# Patient Record
Sex: MALE | Race: WHITE | NOT HISPANIC OR LATINO | URBAN - METROPOLITAN AREA
[De-identification: names, ages, dates, MRNs, and addresses within clinical notes are randomized per-mention and may not be internally consistent; named-entity substitution may affect disease eponyms.]

---

## 2025-06-04 ENCOUNTER — APPOINTMENT (OUTPATIENT)
Dept: PRIMARY CARE | Facility: CLINIC | Age: 60
End: 2025-06-04

## 2025-06-04 VITALS
DIASTOLIC BLOOD PRESSURE: 58 MMHG | RESPIRATION RATE: 16 BRPM | OXYGEN SATURATION: 96 % | WEIGHT: 165 LBS | HEIGHT: 69 IN | BODY MASS INDEX: 24.44 KG/M2 | SYSTOLIC BLOOD PRESSURE: 98 MMHG | HEART RATE: 89 BPM | TEMPERATURE: 96.8 F

## 2025-06-04 DIAGNOSIS — Z76.89 ENCOUNTER TO ESTABLISH CARE WITH NEW DOCTOR: Primary | ICD-10-CM

## 2025-06-04 DIAGNOSIS — E78.2 MIXED HYPERLIPIDEMIA: ICD-10-CM

## 2025-06-04 DIAGNOSIS — N52.9 ERECTILE DYSFUNCTION, UNSPECIFIED ERECTILE DYSFUNCTION TYPE: ICD-10-CM

## 2025-06-04 DIAGNOSIS — E11.9 TYPE 2 DIABETES MELLITUS WITHOUT COMPLICATION, WITHOUT LONG-TERM CURRENT USE OF INSULIN: ICD-10-CM

## 2025-06-04 PROBLEM — M19.90 OSTEOARTHRITIS: Status: ACTIVE | Noted: 2025-06-04

## 2025-06-04 PROBLEM — D72.829 LEUKOCYTOSIS: Status: ACTIVE | Noted: 2025-06-04

## 2025-06-04 PROBLEM — E55.9 VITAMIN D DEFICIENCY: Status: ACTIVE | Noted: 2025-06-04

## 2025-06-04 PROBLEM — R80.9 MICROALBUMINURIA: Status: ACTIVE | Noted: 2025-06-04

## 2025-06-04 PROCEDURE — 4004F PT TOBACCO SCREEN RCVD TLK: CPT | Performed by: STUDENT IN AN ORGANIZED HEALTH CARE EDUCATION/TRAINING PROGRAM

## 2025-06-04 PROCEDURE — 4010F ACE/ARB THERAPY RXD/TAKEN: CPT | Performed by: STUDENT IN AN ORGANIZED HEALTH CARE EDUCATION/TRAINING PROGRAM

## 2025-06-04 PROCEDURE — 3078F DIAST BP <80 MM HG: CPT | Performed by: STUDENT IN AN ORGANIZED HEALTH CARE EDUCATION/TRAINING PROGRAM

## 2025-06-04 PROCEDURE — 3008F BODY MASS INDEX DOCD: CPT | Performed by: STUDENT IN AN ORGANIZED HEALTH CARE EDUCATION/TRAINING PROGRAM

## 2025-06-04 PROCEDURE — RXMED WILLOW AMBULATORY MEDICATION CHARGE

## 2025-06-04 PROCEDURE — 99204 OFFICE O/P NEW MOD 45 MIN: CPT | Performed by: STUDENT IN AN ORGANIZED HEALTH CARE EDUCATION/TRAINING PROGRAM

## 2025-06-04 PROCEDURE — 3074F SYST BP LT 130 MM HG: CPT | Performed by: STUDENT IN AN ORGANIZED HEALTH CARE EDUCATION/TRAINING PROGRAM

## 2025-06-04 RX ORDER — LISINOPRIL 2.5 MG/1
2.5 TABLET ORAL DAILY
Qty: 90 TABLET | Refills: 1 | Status: SHIPPED | OUTPATIENT
Start: 2025-06-04

## 2025-06-04 RX ORDER — LISINOPRIL 2.5 MG/1
2.5 TABLET ORAL DAILY
COMMUNITY
End: 2025-06-04 | Stop reason: SDUPTHER

## 2025-06-04 RX ORDER — METFORMIN HYDROCHLORIDE 1000 MG/1
1000 TABLET ORAL 2 TIMES DAILY
COMMUNITY
End: 2025-06-04 | Stop reason: SDUPTHER

## 2025-06-04 RX ORDER — DULAGLUTIDE 0.75 MG/.5ML
0.75 INJECTION, SOLUTION SUBCUTANEOUS WEEKLY
Qty: 2 ML | Refills: 2 | Status: SHIPPED | OUTPATIENT
Start: 2025-06-04

## 2025-06-04 RX ORDER — ATORVASTATIN CALCIUM 40 MG/1
40 TABLET, FILM COATED ORAL DAILY
Qty: 90 TABLET | Refills: 1 | Status: SHIPPED | OUTPATIENT
Start: 2025-06-04

## 2025-06-04 RX ORDER — SILDENAFIL 100 MG/1
100 TABLET, FILM COATED ORAL DAILY PRN
Qty: 10 TABLET | Refills: 1 | Status: SHIPPED | OUTPATIENT
Start: 2025-06-04

## 2025-06-04 RX ORDER — ATORVASTATIN CALCIUM 40 MG/1
40 TABLET, FILM COATED ORAL DAILY
COMMUNITY
End: 2025-06-04 | Stop reason: SDUPTHER

## 2025-06-04 RX ORDER — HYDROXYZINE HYDROCHLORIDE 10 MG/1
10 TABLET, FILM COATED ORAL 3 TIMES DAILY
COMMUNITY
Start: 2025-02-06 | End: 2025-06-04 | Stop reason: WASHOUT

## 2025-06-04 RX ORDER — GLIMEPIRIDE 2 MG/1
2 TABLET ORAL
Qty: 90 TABLET | Refills: 1 | Status: SHIPPED | OUTPATIENT
Start: 2025-06-04

## 2025-06-04 RX ORDER — GLIMEPIRIDE 2 MG/1
2 TABLET ORAL
COMMUNITY
End: 2025-06-04 | Stop reason: SDUPTHER

## 2025-06-04 RX ORDER — METFORMIN HYDROCHLORIDE 1000 MG/1
1000 TABLET ORAL 2 TIMES DAILY
Qty: 180 TABLET | Refills: 1 | Status: SHIPPED | OUTPATIENT
Start: 2025-06-04

## 2025-06-04 RX ORDER — DAPAGLIFLOZIN 10 MG/1
10 TABLET, FILM COATED ORAL
Qty: 90 TABLET | Refills: 1 | Status: SHIPPED | OUTPATIENT
Start: 2025-06-04

## 2025-06-04 RX ORDER — ASPIRIN 81 MG/1
81 TABLET ORAL DAILY
COMMUNITY

## 2025-06-04 RX ORDER — SILDENAFIL 100 MG/1
100 TABLET, FILM COATED ORAL DAILY PRN
COMMUNITY
End: 2025-06-04 | Stop reason: SDUPTHER

## 2025-06-04 RX ORDER — DAPAGLIFLOZIN 10 MG/1
10 TABLET, FILM COATED ORAL
COMMUNITY
End: 2025-06-04 | Stop reason: SDUPTHER

## 2025-06-04 SDOH — ECONOMIC STABILITY: FOOD INSECURITY: WITHIN THE PAST 12 MONTHS, YOU WORRIED THAT YOUR FOOD WOULD RUN OUT BEFORE YOU GOT MONEY TO BUY MORE.: NEVER TRUE

## 2025-06-04 SDOH — ECONOMIC STABILITY: FOOD INSECURITY: WITHIN THE PAST 12 MONTHS, THE FOOD YOU BOUGHT JUST DIDN'T LAST AND YOU DIDN'T HAVE MONEY TO GET MORE.: NEVER TRUE

## 2025-06-04 ASSESSMENT — ENCOUNTER SYMPTOMS
COLOR CHANGE: 0
PALPITATIONS: 0
ABDOMINAL PAIN: 0
SHORTNESS OF BREATH: 0
FATIGUE: 0
CHILLS: 0
CONFUSION: 0
NAUSEA: 0
VOMITING: 0
WHEEZING: 0
CONSTIPATION: 0
DIARRHEA: 0
HEADACHES: 0
MUSCULOSKELETAL NEGATIVE: 1
UNEXPECTED WEIGHT CHANGE: 0
DIZZINESS: 0
COUGH: 0
FEVER: 0

## 2025-06-04 ASSESSMENT — LIFESTYLE VARIABLES
HOW OFTEN DO YOU HAVE SIX OR MORE DRINKS ON ONE OCCASION: NEVER
AUDIT-C TOTAL SCORE: 1
HOW OFTEN DO YOU HAVE A DRINK CONTAINING ALCOHOL: MONTHLY OR LESS
HOW MANY STANDARD DRINKS CONTAINING ALCOHOL DO YOU HAVE ON A TYPICAL DAY: 1 OR 2
SKIP TO QUESTIONS 9-10: 1

## 2025-06-04 ASSESSMENT — PATIENT HEALTH QUESTIONNAIRE - PHQ9
1. LITTLE INTEREST OR PLEASURE IN DOING THINGS: NOT AT ALL
2. FEELING DOWN, DEPRESSED OR HOPELESS: NOT AT ALL
SUM OF ALL RESPONSES TO PHQ9 QUESTIONS 1 & 2: 0

## 2025-06-04 NOTE — PROGRESS NOTES
"Subjective   Patient ID: Tank Reyna is a 60 y.o. male who presents for New Patient Visit (Pt to establish with PCP.  Pt went to [facility] for a long time, but would like to change.  Pt didn't feel they were helping with his diabetes.  Discussed clinical pharmacy with pt).    HPI   He is a new patient here to establish care and also for chronic care follow-up.  Previously following at [facility].  PMH include diabetes type 2, HLD, HTN, ED.  Meds as listed in EMR.  No major surgeries reported.    # DM2  # HLD   - last A1c 10.5 (02/25)   - taking metformin 1000 mg bid, glimepiride 2 mg daily & farxiga 10 mg daily   - also taking atorva 40 mg daily, baby ASA   - taking ACEi, lisinopril 2.5 mg daily   - last CMP (06/24) was wnl except high BG at 200.     # ED   - reports stable on sildenafil 100 mg prn, req refills     Review of Systems   Constitutional:  Negative for chills, fatigue, fever and unexpected weight change.   HENT: Negative.     Respiratory:  Negative for cough, shortness of breath and wheezing.    Cardiovascular:  Negative for chest pain, palpitations and leg swelling.   Gastrointestinal:  Negative for abdominal pain, constipation, diarrhea, nausea and vomiting.   Musculoskeletal: Negative.    Skin:  Negative for color change and rash.   Neurological:  Negative for dizziness and headaches.   Psychiatric/Behavioral:  Negative for behavioral problems and confusion.        Objective   BP 98/58 (BP Location: Right arm, Patient Position: Sitting, BP Cuff Size: Adult)   Pulse 89   Temp 36 °C (96.8 °F) (Temporal)   Resp 16   Ht 1.74 m (5' 8.5\")   Wt 74.8 kg (165 lb)   SpO2 96%   BMI 24.72 kg/m²     Physical Exam  Vitals and nursing note reviewed.   Constitutional:       Appearance: Normal appearance.   Cardiovascular:      Rate and Rhythm: Normal rate and regular rhythm.      Pulses: Normal pulses.      Heart sounds: Normal heart sounds.   Pulmonary:      Effort: Pulmonary effort is normal.      Breath " sounds: Normal breath sounds.   Abdominal:      General: Abdomen is flat. Bowel sounds are normal.      Palpations: Abdomen is soft.   Musculoskeletal:         General: Normal range of motion.   Neurological:      General: No focal deficit present.      Mental Status: He is alert.   Psychiatric:         Mood and Affect: Mood normal.         Behavior: Behavior normal.         Assessment/Plan   He is a new patient here to establish care and also for chronic care follow-up.  Previously following at [facility].  PMH include diabetes type 2, HLD, HTN, ED.    Noted to have poorly controlled diabetes as listed below.  Will add Trulicity and also place referral to see Pharm.D. other plan as follows    # DM2  # HLD   - last A1c 10.5 (02/25), poorly controlled  - Start Trulicity 0.75 mg weekly and titrate up as tolerated  - Continue metformin 1000 mg bid, glimepiride 2 mg daily & farxiga 10 mg daily as usual  - Plan to gradually wean off glimepiride as we increase Trulicity dose  - Continue atorva 40 mg daily, baby ASA   - Continue lisinopril 2.5 mg daily  - Added A1c, urine albumin and other blood work  - Place referral to see Pharm.D. for help on meds coverage and titration as needed    # ED   - Stable symptoms on sildenafil 100 mg as needed, continue same.  Rx refilled.    Problem List Items Addressed This Visit           ICD-10-CM    ED (erectile dysfunction) N52.9    Relevant Medications    sildenafil (Viagra) 100 mg tablet    Mixed hyperlipidemia E78.2    Relevant Medications    atorvastatin (Lipitor) 40 mg tablet    Other Relevant Orders    Lipid Panel    Type 2 diabetes mellitus E11.9    Relevant Medications    metFORMIN (Glucophage) 1,000 mg tablet    glimepiride (Amaryl) 2 mg tablet    dapagliflozin propanediol (Farxiga) 10 mg tablet    lisinopril 2.5 mg tablet    dulaglutide (Trulicity) 0.75 mg/0.5 mL pen injector    Other Relevant Orders    Referral to Clinical Pharmacy    Comprehensive Metabolic Panel    CBC     Hemoglobin A1c    Albumin-Creatinine Ratio, Urine Random     Other Visit Diagnoses         Codes      Encounter to establish care with new doctor    -  Primary Z76.89          Rtc 3 mo for FU     This note was partially generated using the Dragon Voice recognition system. There may be some incorrect wording, spelling and/or spelling errors or punctuation errors that were not corrected prior to committing the note to the medical record.      Luis Acosta MD    [facility]

## 2025-06-06 ENCOUNTER — TELEPHONE (OUTPATIENT)
Dept: PHARMACY | Facility: HOSPITAL | Age: 60
End: 2025-06-06
Payer: COMMERCIAL

## 2025-06-06 NOTE — TELEPHONE ENCOUNTER
Patient is scheduled for pharmacy visit in July however cannot afford Farxiga at this time (Trulicity is $0).  Script was sent to  [place]; applied 1-month free trial voucher and spoke with [facility] to make sure everything was all set on their end and they will follow up with patient regarding pickup.     Delma Doss, PharmD.  Clinical Support Pharmacist   [Follow - Up] : a follow-up visit [Hyperthyroidism] : hyperthyroidism [Thyroid nodule/ MNG] : thyroid nodule/ MNG

## 2025-06-13 ENCOUNTER — PHARMACY VISIT (OUTPATIENT)
Dept: PHARMACY | Facility: CLINIC | Age: 60
End: 2025-06-13
Payer: COMMERCIAL

## 2025-07-08 ENCOUNTER — APPOINTMENT (OUTPATIENT)
Dept: PHARMACY | Facility: HOSPITAL | Age: 60
End: 2025-07-08
Payer: COMMERCIAL

## 2025-07-10 PROCEDURE — RXMED WILLOW AMBULATORY MEDICATION CHARGE

## 2025-07-13 ENCOUNTER — PHARMACY VISIT (OUTPATIENT)
Dept: PHARMACY | Facility: CLINIC | Age: 60
End: 2025-07-13
Payer: COMMERCIAL

## 2025-08-06 DIAGNOSIS — Z12.11 SCREENING FOR COLORECTAL CANCER: ICD-10-CM

## 2025-08-06 DIAGNOSIS — Z12.12 SCREENING FOR COLORECTAL CANCER: ICD-10-CM

## 2025-08-10 PROCEDURE — RXMED WILLOW AMBULATORY MEDICATION CHARGE

## 2025-08-12 ENCOUNTER — PHARMACY VISIT (OUTPATIENT)
Dept: PHARMACY | Facility: CLINIC | Age: 60
End: 2025-08-12
Payer: COMMERCIAL

## 2025-08-15 ENCOUNTER — APPOINTMENT (OUTPATIENT)
Dept: PHARMACY | Facility: HOSPITAL | Age: 60
End: 2025-08-15
Payer: COMMERCIAL

## 2025-09-04 ENCOUNTER — APPOINTMENT (OUTPATIENT)
Dept: PHARMACY | Facility: HOSPITAL | Age: 60
End: 2025-09-04
Payer: COMMERCIAL

## 2025-09-15 ENCOUNTER — APPOINTMENT (OUTPATIENT)
Dept: PRIMARY CARE | Facility: CLINIC | Age: 60
End: 2025-09-15
Payer: COMMERCIAL

## 2025-10-02 ENCOUNTER — APPOINTMENT (OUTPATIENT)
Dept: PHARMACY | Facility: HOSPITAL | Age: 60
End: 2025-10-02
Payer: COMMERCIAL